# Patient Record
Sex: FEMALE | Race: OTHER
[De-identification: names, ages, dates, MRNs, and addresses within clinical notes are randomized per-mention and may not be internally consistent; named-entity substitution may affect disease eponyms.]

---

## 2018-06-29 LAB
ANION GAP SERPL CALC-SCNC: 14 MMOL/L (ref 5–19)
APPEARANCE UR: CLEAR
APTT PPP: (no result) S
BILIRUB UR QL STRIP: NEGATIVE
BUN SERPL-MCNC: 7 MG/DL (ref 7–20)
CALCIUM: 9.4 MG/DL (ref 8.4–10.2)
CHLORIDE SERPL-SCNC: 102 MMOL/L (ref 98–107)
CO2 SERPL-SCNC: 27 MMOL/L (ref 22–30)
ERYTHROCYTE [DISTWIDTH] IN BLOOD BY AUTOMATED COUNT: 12.4 % (ref 11.5–14)
GLUCOSE SERPL-MCNC: 77 MG/DL (ref 75–110)
GLUCOSE UR STRIP-MCNC: NEGATIVE MG/DL
HCT VFR BLD CALC: 37.6 % (ref 36–47)
HGB BLD-MCNC: 13.2 G/DL (ref 12–15.5)
KETONES UR STRIP-MCNC: NEGATIVE MG/DL
MCH RBC QN AUTO: 32.8 PG (ref 27–33.4)
MCHC RBC AUTO-ENTMCNC: 35.1 G/DL (ref 32–36)
MCV RBC AUTO: 93 FL (ref 80–97)
NITRITE UR QL STRIP: NEGATIVE
PH UR STRIP: 8 [PH] (ref 5–9)
PLATELET # BLD: 170 10^3/UL (ref 150–450)
POTASSIUM SERPL-SCNC: 4.2 MMOL/L (ref 3.6–5)
PROT UR STRIP-MCNC: NEGATIVE MG/DL
RBC # BLD AUTO: 4.02 10^6/UL (ref 3.72–5.28)
SODIUM SERPL-SCNC: 143.1 MMOL/L (ref 137–145)
SP GR UR STRIP: 1
UROBILINOGEN UR-MCNC: NEGATIVE MG/DL (ref ?–2)
WBC # BLD AUTO: 5 10^3/UL (ref 4–10.5)

## 2018-07-05 ENCOUNTER — HOSPITAL ENCOUNTER (OUTPATIENT)
Dept: HOSPITAL 62 - OROUT | Age: 38
Discharge: HOME | End: 2018-07-05
Attending: ORTHOPAEDIC SURGERY
Payer: OTHER GOVERNMENT

## 2018-07-05 VITALS — SYSTOLIC BLOOD PRESSURE: 113 MMHG | DIASTOLIC BLOOD PRESSURE: 74 MMHG

## 2018-07-05 DIAGNOSIS — M22.2X1: Primary | ICD-10-CM

## 2018-07-05 DIAGNOSIS — M22.41: ICD-10-CM

## 2018-07-05 PROCEDURE — 29999 UNLISTED PX ARTHROSCOPY: CPT

## 2018-07-05 PROCEDURE — 81025 URINE PREGNANCY TEST: CPT

## 2018-07-05 PROCEDURE — 80048 BASIC METABOLIC PNL TOTAL CA: CPT

## 2018-07-05 PROCEDURE — 81001 URINALYSIS AUTO W/SCOPE: CPT

## 2018-07-05 PROCEDURE — 85027 COMPLETE CBC AUTOMATED: CPT

## 2018-07-05 PROCEDURE — 36415 COLL VENOUS BLD VENIPUNCTURE: CPT

## 2018-07-05 PROCEDURE — S0119 ONDANSETRON 4 MG: HCPCS

## 2018-07-05 PROCEDURE — 29875 ARTHRS KNEE SURG SYNVCT LMTD: CPT

## 2018-07-05 NOTE — OPERATIVE REPORT
Operative Report


DATE OF SURGERY: 07/05/18


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 

syndrome


POSTOPERATIVE DIAGNOSIS: Same


OPERATION: Right knee arthroscopic chondroplasty and medial plica excision


SURGEON: JUSTINE SALDANA


ANESTHESIA: GA


TISSUE REMOVED OR ALTERED: none


COMPLICATIONS: 





none


ESTIMATED BLOOD LOSS: 10 mL


INTRAOPERATIVE FINDINGS: As above


PROCEDURE: 





Patient was brought to the operating room and successfully induced and 

intubated in a the supine position.  Once the tube was secured the right lower 

extremity thigh tourniquet was applied and the left lower extremity was prepped 

and draped in a normal surgical fashion.  Timeout was done identifying the left 

knee has a correct site.  The extremity was held elevated for a couple minutes 

and then tourniquet was inflated at 300 mmHg 0.5% of Marcaine was injected into 

the anticipated portal sites.  11 blade was used to establish the anterolateral 

portal.  Scope was introduced and the capsule was distended with sterile saline 

solution.  Under direct visualization the anteromedial portal sites was 

established first by applying a spinal needle and then established with the 11 

blade.  Probe was introduced and a diagnostic scope was done.  Noted that the 

patient had 2 areas of defect right one at the dome and the other at the medial 

facet.  Patient had also a medial plica shelf.  Through the medial portal I 

used a shaver to debride the medial plica.  Was able to resect the shelf and 

noted with the knee in flexion that there was no tissue impingement or rubbing 

over the femoral condyle.  Also satisfied with excision I then used the same 

shaver to do a limited chondroplasty of the medial facet and the dome of the 

patella.  Muscle satisfied with the chondroplasty of then proceeded to remove 

the fluid from the knee.  The 2 portal sites were closed with 3-0 nylon and 

then covered with Xeroform 4 x 4 dressing and ABD pad.  The extremity was 

overwrapped with soft roll and Ace bandage and the tourniquet was let down at 

14 minutes.  Patient was successfully extubated and sent to PACU in stable 

condition

## 2018-07-05 NOTE — DISCHARGE SUMMARY
Discharge Summary (SDC)





- Discharge


Final Diagnosis: 





Right knee arthroscopic chondroplasty and medial plica excision


Date of Surgery: 07/05/18


Discharge Date: 07/05/18


Condition: Good


Treatment or Instructions: 


Patient instructed to follow up in 10-14 days.


Patient instructed to keep dressing dry clean and intact for 4 days and then 

allowed to remove.  At that point patient can shower and apply Band-Aids as 

needed.


Patient can weight-bear as tolerated and do range of motion exercises as 

tolerated.


Crutches for support and safety.  Can wean crutches once stable on his feet.


Patient instructed to call the office if patient develops fevers chills redness 

and drainage from the surgical sites.


Prescriptions: 


Oxycodone HCl/Acetaminophen [Percocet 5-325 mg Tablet] 1 - 2 tab PO ASDIR PRN #

25 tablet


 PRN Reason: 


Discharge Diet: As Tolerated


Respiratory Treatments at Home: Deep Breathing/Coughing


Discharge Activity: No Driving, No Lifting/Push/Pulling, Slowly Increase 

Activity


Home Care Assistance: None Needed


Report the Following to Your Physician Immediately: Shortness of Breath, 

Vomiting, Increase in Pain, Fever over 101 Degrees, Unusual Bleeding, Redness, 

Swelling, Warmth, Increased Soreness, Drainage-Yellow, Drainage-Green